# Patient Record
Sex: FEMALE | Race: WHITE | URBAN - METROPOLITAN AREA
[De-identification: names, ages, dates, MRNs, and addresses within clinical notes are randomized per-mention and may not be internally consistent; named-entity substitution may affect disease eponyms.]

---

## 2019-01-01 ENCOUNTER — TRANSFERRED RECORDS (OUTPATIENT)
Dept: MULTI SPECIALTY CLINIC | Facility: CLINIC | Age: 49
End: 2019-01-01

## 2019-01-01 LAB — PAP SMEAR - HIM PATIENT REPORTED: NORMAL

## 2019-11-02 ENCOUNTER — HEALTH MAINTENANCE LETTER (OUTPATIENT)
Age: 49
End: 2019-11-02

## 2020-11-14 ENCOUNTER — HEALTH MAINTENANCE LETTER (OUTPATIENT)
Age: 50
End: 2020-11-14

## 2021-02-06 ENCOUNTER — HEALTH MAINTENANCE LETTER (OUTPATIENT)
Age: 51
End: 2021-02-06

## 2021-09-12 ENCOUNTER — HEALTH MAINTENANCE LETTER (OUTPATIENT)
Age: 51
End: 2021-09-12

## 2022-01-02 ENCOUNTER — HEALTH MAINTENANCE LETTER (OUTPATIENT)
Age: 52
End: 2022-01-02

## 2022-02-27 ENCOUNTER — HEALTH MAINTENANCE LETTER (OUTPATIENT)
Age: 52
End: 2022-02-27

## 2022-07-14 ENCOUNTER — OFFICE VISIT (OUTPATIENT)
Dept: INTERNAL MEDICINE | Facility: CLINIC | Age: 52
End: 2022-07-14
Payer: COMMERCIAL

## 2022-07-14 VITALS
HEART RATE: 65 BPM | OXYGEN SATURATION: 98 % | HEIGHT: 63 IN | SYSTOLIC BLOOD PRESSURE: 119 MMHG | TEMPERATURE: 98.4 F | WEIGHT: 109 LBS | BODY MASS INDEX: 19.31 KG/M2 | DIASTOLIC BLOOD PRESSURE: 78 MMHG

## 2022-07-14 DIAGNOSIS — Z12.11 SCREEN FOR COLON CANCER: ICD-10-CM

## 2022-07-14 DIAGNOSIS — G89.29 CHRONIC NECK PAIN: ICD-10-CM

## 2022-07-14 DIAGNOSIS — M54.2 CHRONIC NECK PAIN: ICD-10-CM

## 2022-07-14 DIAGNOSIS — Z00.00 ROUTINE GENERAL MEDICAL EXAMINATION AT A HEALTH CARE FACILITY: Primary | ICD-10-CM

## 2022-07-14 DIAGNOSIS — G47.9 SLEEP DIFFICULTIES: ICD-10-CM

## 2022-07-14 LAB — HBA1C MFR BLD: 5.7 % (ref 0–5.6)

## 2022-07-14 PROCEDURE — 99214 OFFICE O/P EST MOD 30 MIN: CPT | Mod: 25 | Performed by: NURSE PRACTITIONER

## 2022-07-14 PROCEDURE — 36416 COLLJ CAPILLARY BLOOD SPEC: CPT | Performed by: NURSE PRACTITIONER

## 2022-07-14 PROCEDURE — 83036 HEMOGLOBIN GLYCOSYLATED A1C: CPT | Performed by: NURSE PRACTITIONER

## 2022-07-14 PROCEDURE — 99386 PREV VISIT NEW AGE 40-64: CPT | Performed by: NURSE PRACTITIONER

## 2022-07-14 RX ORDER — CYCLOBENZAPRINE HCL 5 MG
5 TABLET ORAL 3 TIMES DAILY PRN
Qty: 30 TABLET | Refills: 1 | Status: SHIPPED | OUTPATIENT
Start: 2022-07-14

## 2022-07-14 RX ORDER — ZOLPIDEM TARTRATE 10 MG/1
10 TABLET ORAL
Qty: 30 TABLET | Refills: 1 | Status: SHIPPED | OUTPATIENT
Start: 2022-07-14 | End: 2023-08-28

## 2022-07-14 RX ORDER — ZOLPIDEM TARTRATE 10 MG/1
10 TABLET ORAL
COMMUNITY
End: 2023-08-28

## 2022-07-14 RX ORDER — METHYLPREDNISOLONE 4 MG
TABLET, DOSE PACK ORAL
Qty: 21 TABLET | Refills: 0 | Status: SHIPPED | OUTPATIENT
Start: 2022-07-14

## 2022-07-14 ASSESSMENT — ENCOUNTER SYMPTOMS
HEMATOCHEZIA: 0
PARESTHESIAS: 0
HEMATURIA: 0
JOINT SWELLING: 1
SHORTNESS OF BREATH: 0
PALPITATIONS: 0
BREAST MASS: 0
WEAKNESS: 0
DIARRHEA: 0
ABDOMINAL PAIN: 0
EYE PAIN: 0
ARTHRALGIAS: 1
COUGH: 0
NAUSEA: 0
HEARTBURN: 0
NERVOUS/ANXIOUS: 0
DYSURIA: 0
HEADACHES: 1
SORE THROAT: 0
CONSTIPATION: 0
MYALGIAS: 1
FREQUENCY: 1
FEVER: 0
DIZZINESS: 0
CHILLS: 0

## 2022-07-14 NOTE — PROGRESS NOTES
"amb   SUBJECTIVE:   CC: Bob Mckeon is an 51 year old woman who presents for preventive health visit.     Used to follow with  [facility] in [place], PCP retired.  Lives in [place].      Patient has been advised of split billing requirements and indicates understanding: Yes     Healthy Habits:     Getting at least 3 servings of Calcium per day:  Yes    Bi-annual eye exam:  Yes    Dental care twice a year:  Yes    Sleep apnea or symptoms of sleep apnea:  None    Diet:  Regular (no restrictions)    Frequency of exercise:  2-3 days/week    Duration of exercise:  30-45 minutes    Taking medications regularly:  Yes    Medication side effects:  Not applicable    PHQ-2 Total Score: 0    Additional concerns today:  No    Health Maintenance Screening:    -Immunizations:   -Influenza: Up to date   -Pneumococcal: NA   -Td/Tdap: Up to date   -Shingles: Due   -HPV: NA   -MMR: NA   -COVID: Anna x1; declines Booster    -Colon Cancer Screening:  Due    -Lung Cancer Screening:  NA    -Breast Cancer Screening: Up to date- had in [place] 2022    -Cervical Cancer Screening:  3 Years ago- \"normal\"    -STD Screen:  Low risk    -Cholesterol Screening:  Up to date; completed June, 2021    SPECIMEN TYPE     CHOLESTEROL <200 mg/dL 188    TRIGLYCERIDES PROFILE <150 mg/dL 111    LDL CHOL, CALC <100 mg/dL 122 High     HDL CHOLESTEROL >40 mg/dL 44    CHOL/HDL RATIO 0.0 - 4.9 4.3      -Diabetes Screening:  Due; last completed June, 2021    HBA1C (GLYCOSOLATED HGB) <5.7 % 5.9 High     EAG (EST. AVERAGE GLUCOSE) <117 mg/dL 123 High       -Depression Screening: Due    Today's PHQ-2 Score:   PHQ-2 ( 1999 [employer]) 7/14/2022   Q1: Little interest or pleasure in doing things 0   Q2: Feeling down, depressed or hopeless 0   PHQ-2 Score 0   Q1: Little interest or pleasure in doing things Not at all   Q2: Feeling down, depressed or hopeless Not at all   PHQ-2 Score 0     Other Health Concerns:    Chronic neck pain:  States she has " had chronic neck pain for years.  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  She has pain along both sides of her neck.  She follows regularly with a chiropractor.  Recently has been having more difficulty turning her neck due to the pain and tightness.  Her chiropractor recommended that she come in to be seen for a course of prednisone therapy.  States that she will generally take prednisone when her symptoms are this bad.  She is also requesting refill of muscle relaxers.  She does endorse that on occasion she has numbness and tingling of both arms.  She has no weakness.  She also uses a TENS unit and soaks in her hot tub at nighttime.    Insomnia:  Does shift work, and has difficulty sleeping from time to time.  Has previously been prescribed as needed Ambien which she takes very infrequently.  She would like a refill for her intermittent and infrequent use.  Denies any side effects from this medication.    Abuse: Current or Past (Physical, Sexual or Emotional) - No  Do you feel safe in your environment? Yes    Have you ever done Advance Care Planning? (For example, a Health Directive, POLST, or a discussion with a medical provider or your loved ones about your wishes): No, advance care planning information given to patient to review.  Patient declined advance care planning discussion at this time.    Social History     Tobacco Use     Smoking status: Former Smoker     Smokeless tobacco: Former User     Quit date: 6/22/2013     Tobacco comment: 2 cigs per day   Substance Use Topics     Alcohol use: Yes     Comment: occasional     If you drink alcohol do you typically have >3 drinks per day or >7 drinks per week? No    Alcohol Use 7/14/2022   Prescreen: >3 drinks/day or >7 drinks/week? No   No flowsheet data found.    Reviewed orders with patient.  Reviewed health maintenance and updated orders accordingly - Yes  Labs reviewed in EPIC    Breast  Cancer Screening:  Any new diagnosis of family breast, ovarian, or bowel cancer? No    FHS-7: No flowsheet data found.    Mammogram Screening: Recommended annual mammography  Pertinent mammograms are reviewed under the imaging tab.    History of abnormal Pap smear: NO - age 30-65 PAP every 5 years with negative HPV co-testing recommended     Reviewed and updated as needed this visit by clinical staff   Tobacco  Allergies  Meds                Reviewed and updated as needed this visit by Provider                   Past Medical History:   Diagnosis Date     Arthritis     Sonce birth      Past Surgical History:   Procedure Laterality Date     BREAST SURGERY  2006     CHOLECYSTECTOMY       COSMETIC SURGERY  2006    Breast aug     OB History   No obstetric history on file.       Review of Systems   Constitutional: Negative for chills and fever.   HENT: Negative for congestion, ear pain, hearing loss and sore throat.    Eyes: Negative for pain and visual disturbance.   Respiratory: Negative for cough and shortness of breath.    Cardiovascular: Negative for chest pain, palpitations and peripheral edema.   Gastrointestinal: Negative for abdominal pain, constipation, diarrhea, heartburn, hematochezia and nausea.   Breasts:  Negative for tenderness, breast mass and discharge.   Genitourinary: Positive for frequency. Negative for dysuria, genital sores, hematuria, pelvic pain, urgency, vaginal bleeding and vaginal discharge.   Musculoskeletal: Positive for arthralgias, joint swelling and myalgias.   Skin: Negative for rash.   Neurological: Positive for headaches. Negative for dizziness, weakness and paresthesias.   Psychiatric/Behavioral: Negative for mood changes. The patient is not nervous/anxious.      CONSTITUTIONAL: NEGATIVE for fever, chills, change in weight  INTEGUMENTARU/SKIN: NEGATIVE for worrisome rashes, moles or lesions  EYES: NEGATIVE for vision changes or irritation  ENT: NEGATIVE for ear, mouth and throat  "problems  RESP: NEGATIVE for significant cough or SOB  BREAST: NEGATIVE for masses, tenderness or discharge  CV: NEGATIVE for chest pain, palpitations or peripheral edema  GI: NEGATIVE for nausea, abdominal pain, heartburn, or change in bowel habits  : NEGATIVE for unusual urinary or vaginal symptoms. Periods are regular.  MUSCULOSKELETAL: POSITIVE for neck pain, chronic  NEURO: NEGATIVE for weakness, dizziness or paresthesias  PSYCHIATRIC: NEGATIVE for changes in mood or affect     OBJECTIVE:   /78 (BP Location: Left arm, Cuff Size: Adult Regular)   Pulse 65   Temp 98.4  F (36.9  C) (Oral)   Ht 1.588 m (5' 2.5\")   Wt 49.4 kg (109 lb)   SpO2 98%   BMI 19.62 kg/m       Physical Exam  GENERAL: healthy, alert and no distress  EYES: Eyes grossly normal to inspection, PERRL and conjunctivae and sclerae normal  HENT: ear canals and TM's normal, nose and mouth without ulcers or lesions  NECK: no adenopathy, no asymmetry, masses, or scars and thyroid normal to palpation  RESP: lungs clear to auscultation - no rales, rhonchi or wheezes  CV: regular rate and rhythm, normal S1 S2, no S3 or S4, no murmur, click or rub, no peripheral edema and peripheral pulses strong  ABDOMEN: soft, nontender, no hepatosplenomegaly, no masses and bowel sounds normal  MS: Limited ROM neck- difficulty turning side to side  SKIN: no suspicious lesions or rashes  NEURO: Normal strength and tone, mentation intact and speech normal  PSYCH: mentation appears normal, affect normal/bright    Diagnostic Test Results:  Labs reviewed in Epic    ASSESSMENT/PLAN:   Bob was seen today for physical.    Diagnoses and all orders for this visit:    Routine general medical examination at a health care facility  -     REVIEW OF HEALTH MAINTENANCE PROTOCOL ORDERS  -     Hemoglobin A1c; Future  -     Hemoglobin A1c  -     Check A1c  -     FIT ordered  -     States mammogram up to date  -     States Pap up to date- am unable to find results  -     " "Declines COVID booster  -     Lipids up to date  -     Due for shingles vac- declines    Screen for colon cancer  -     Fecal colorectal cancer screen (FIT); Future  -     Fecal colorectal cancer screen (FIT)    Chronic neck pain  -     cyclobenzaprine (FLEXERIL) 5 MG tablet; Take 1 tablet (5 mg) by mouth 3 times daily as needed for muscle spasms  -     methylPREDNISolone (MEDROL DOSEPAK) 4 MG tablet therapy pack; Follow Package Directions  -     Reports chronic issue- injured at work.  Follows with chiropractor.  Requesting steroids, refill muscle relaxers    Sleep difficulties  -     zolpidem (AMBIEN) 10 MG tablet; Take 1 tablet (10 mg) by mouth nightly as needed for sleep  -     Refilled x1        Patient has been advised of split billing requirements and indicates understanding: Yes    COUNSELING:  Reviewed preventive health counseling, as reflected in patient instructions    Estimated body mass index is 19.62 kg/m  as calculated from the following:    Height as of this encounter: 1.588 m (5' 2.5\").    Weight as of this encounter: 49.4 kg (109 lb).        She reports that she has quit smoking. She quit smokeless tobacco use about 9 years ago.      Counseling Resources:  ATP IV Guidelines  Pooled Cohorts Equation Calculator  Breast Cancer Risk Calculator  BRCA-Related Cancer Risk Assessment: FHS-7 Tool  FRAX Risk Assessment  ICSI Preventive Guidelines  Dietary Guidelines for Americans, 2010  USDA's MyPlate  ASA Prophylaxis  Lung CA Screening    Araceli Collazo, APRN CNP  [facility]  "

## 2022-07-16 ENCOUNTER — TELEPHONE (OUTPATIENT)
Dept: NURSING | Facility: CLINIC | Age: 52
End: 2022-07-16

## 2022-07-16 NOTE — TELEPHONE ENCOUNTER
Medrol dose pack 7/14, threw away, looking to have Rx re-sent over, Pharmacy calling reporting the patient threw her Medrol dose pack in the garbage at work, requesting refill sent to pharmacy. Dr. Baeza contacted with ok for refill. Verbal order given for refill given to pharmacist.     Miranda Monsivais RN 07/16/22 12:02 PM   [facility] Triage Nurse Advisor

## 2022-07-22 PROCEDURE — 82274 ASSAY TEST FOR BLOOD FECAL: CPT | Performed by: NURSE PRACTITIONER

## 2022-07-24 LAB — HEMOCCULT STL QL IA: NEGATIVE

## 2022-11-19 ENCOUNTER — HEALTH MAINTENANCE LETTER (OUTPATIENT)
Age: 52
End: 2022-11-19

## 2023-04-09 ENCOUNTER — HEALTH MAINTENANCE LETTER (OUTPATIENT)
Age: 53
End: 2023-04-09

## 2023-08-28 ENCOUNTER — OFFICE VISIT (OUTPATIENT)
Dept: INTERNAL MEDICINE | Facility: CLINIC | Age: 53
End: 2023-08-28
Payer: COMMERCIAL

## 2023-08-28 ENCOUNTER — TRANSFERRED RECORDS (OUTPATIENT)
Dept: HEALTH INFORMATION MANAGEMENT | Facility: CLINIC | Age: 53
End: 2023-08-28

## 2023-08-28 VITALS
OXYGEN SATURATION: 95 % | DIASTOLIC BLOOD PRESSURE: 74 MMHG | WEIGHT: 121 LBS | SYSTOLIC BLOOD PRESSURE: 118 MMHG | TEMPERATURE: 97.8 F | HEIGHT: 63 IN | HEART RATE: 67 BPM | BODY MASS INDEX: 21.44 KG/M2

## 2023-08-28 DIAGNOSIS — F51.01 PRIMARY INSOMNIA: ICD-10-CM

## 2023-08-28 DIAGNOSIS — Z12.31 VISIT FOR SCREENING MAMMOGRAM: ICD-10-CM

## 2023-08-28 DIAGNOSIS — Z12.11 SCREEN FOR COLON CANCER: ICD-10-CM

## 2023-08-28 DIAGNOSIS — R73.09 ELEVATED GLUCOSE: ICD-10-CM

## 2023-08-28 DIAGNOSIS — Z00.00 ENCOUNTER FOR ROUTINE ADULT HEALTH EXAMINATION WITHOUT ABNORMAL FINDINGS: Primary | ICD-10-CM

## 2023-08-28 DIAGNOSIS — Z13.29 SCREENING FOR THYROID DISORDER: ICD-10-CM

## 2023-08-28 DIAGNOSIS — Z13.6 CARDIOVASCULAR SCREENING; LDL GOAL LESS THAN 130: ICD-10-CM

## 2023-08-28 DIAGNOSIS — G47.9 SLEEP DIFFICULTIES: ICD-10-CM

## 2023-08-28 PROCEDURE — 99396 PREV VISIT EST AGE 40-64: CPT | Performed by: INTERNAL MEDICINE

## 2023-08-28 RX ORDER — ZOLPIDEM TARTRATE 10 MG/1
5-10 TABLET ORAL
Qty: 30 TABLET | Refills: 1 | Status: SHIPPED | OUTPATIENT
Start: 2023-08-28

## 2023-08-28 ASSESSMENT — ENCOUNTER SYMPTOMS
WEAKNESS: 0
HEARTBURN: 0
NAUSEA: 0
JOINT SWELLING: 0
PARESTHESIAS: 0
SHORTNESS OF BREATH: 0
HEMATOCHEZIA: 0
ARTHRALGIAS: 0
HEMATURIA: 0
DYSURIA: 0
FEVER: 0
ABDOMINAL PAIN: 0
FREQUENCY: 0
EYE PAIN: 0
DIARRHEA: 0
COUGH: 0
NERVOUS/ANXIOUS: 0
MYALGIAS: 0
SORE THROAT: 0
HEADACHES: 0
DIZZINESS: 0
BREAST MASS: 0
PALPITATIONS: 0
CONSTIPATION: 0
CHILLS: 0

## 2023-08-28 NOTE — PATIENT INSTRUCTIONS
"  Return for a \"lab only appointment\" in the next 1-2 weeks to recheck fasting labs.  Please get these labs done in a fasting state with nothing to eat for at least 8 hours prior to getting labs drawn.  You can make this \"lab only appointment\" at any [facility] lab site, contact that clinic site directly to arrange this.  I will make contact either via phone or [employer] regarding the results and any recommendations once I have reviewed the lab results.          5 GOALS TO PREVENT VASCULAR DISEASE:     1.  Aggressive blood pressure control, under 130/80 ideally.  Using medications if needed.    Your blood pressure is under good control    BP Readings from Last 4 Encounters:   08/28/23 118/74   07/14/22 119/78   09/14/14 96/74   07/22/13 96/58       2.  Aggressive LDL cholesterol (\"bad cholesterol\") lowering as indicated.    Your goal is an LDL under 130 for sure, preferably under 100.  (If you have diabetes or previous vascular disease, the the LDL goals would be under 100 for sure, preferably under 70.)    New guidelines identify four high-risk groups who could benefit from statins:   *people with pre-existing heart disease, such as those who have had a heart attack;   *people ages 40 to 75 who have diabetes of any type  *patients ages 40 to 75 with at least a 7.5% risk of developing cardiovascular disease over the next decade, according to a formula described in the guidelines  *patients with the sort of super-high cholesterol that sometimes runs in families, as evidenced by an LDL of 190 milligrams per deciliter or higher      3.  Aggressive diabetic prevention, screening and/or management.      You do not have diabetes as of the most recent blood tests.     4.  No smoking    5.  Consider daily preventative aspirin over age 50 if you have enough cardiac risk factors to place you at higher risk for the presence of vascular disease.    If you have any reason not to take aspirin such easy bruising or " bleeding, stomach problems, other anticoagulant medications, or any other side effects, then you should not take Aspirin.     --Based on your current cardiac disease risk profile and/or age over 75, you do NOT need to take daily preventative aspirin.          ColoGuard Colon cancer screening:     *  The ColoGuard stool test is good noninvasive way to screen for colon cancer, in average risk individuals.      *  You should NOT use ColoGuard for colon cancer screening if you have a family history of colon cancer or if you have a history of pre-cancerous polyps, you should have a colonoscopy if you have either of these situations.      *  The ColoGuard collection kit will be mailed to your home address by the [email or web address].  Follow the instructions for collecting and returning the tablet.      *  I will inform you about the results.     *  If the ColoGuard test is Negative, then no colon cancer screening needed for 3 years (would consider either repeating the ColoGuard test or else doing a colonoscopy).     *  If the ColoGuard test is Positive, this does NOT mean that you have colon cacner, it simply means that you will need to have a colonoscopy.  Most of the time, pre-cancerous polyps can turn this test positive.          Preventive Health Recommendations  Female Ages 45 - 64    Yearly exam: See your health care provider every year in order to  Review health changes in your health history or your family medical history  Discuss preventive care.    Review and re evaluate any chronic medical conditions  Review and renew any prescription medicines, if you take prescription medicines.    Review and aggressively manage any vascular disease risk factors    Get a Pap test every 3-5 years (unless you have an abnormal result and your provider advises testing more often).  If you get Pap tests with HPV test, you only need to test every 5 years, unless you have an abnormal result.   You do not need a Pap test if your uterus  was removed (hysterectomy) and you have not had cancer.  You should be tested each year for STDs (sexually transmitted diseases) if you're at risk.   Have a mammogram every 1 to 2 years (recommended annually between age 45 and 55)  Regular colon cancer screening starting age 45 with either a colonoscopy, or stool test (either Cologuard every 3 years or yearly FIT stool test from us).  The intervals for colon cancer screening will be determined by family history and prior colon cancer screening results.  If you have a family history of colon cancer, then colon cancer screening should be considered starting age 35.     Have a cholesterol test every 5 years, or more often if advised.  Have a diabetes test (fasting glucose) at least every three years. If you are at risk for diabetes, you should have this test annually.   If you are at risk for osteoporosis (brittle bone disease), think about having a bone density scan (DEXA).    Shots:   Get a flu shot each year. Get a tetanus shot every 10 years.  Get annual influenza vaccine (flu shot) each year  Get tetanus shot every 10 years  Pneumonia vaccine should be given at age 65 unless you have medical conditions that would make us given earlier  Everyone over 50 should consider the Shingrix shingles vaccine to reduce the chance of future shingles infection.  As many as 1 and 3 adults may experience this infection in their lifetime based on the previous chickenpox infection in your youth many years ago  Covid vaccines are now recommended annually.  Consider getting the most updated COVID booster around the same time as the annual influenza vaccine.    Nutrition:   Eat at least 5 servings of fruits and vegetables each day.  Eat whole-grain bread, whole-wheat pasta and brown rice instead of white grains and rice.  Talk to your provider about Calcium and Vitamin D.    --Calcium: aim for 1200 mg per day (any brand is fine)   --Vitamin D3 at least 1000 units per day (any brand is  "fine)       --Good Grains:  Oats, brown rice, Quinoa (these do not raise the blood sugar as much)     --Bad grains:  Anything made from wheat or white rice     (because these raise the blood sugars significantly, and the possible gluten issue from wheat for some people).      --Proteins:  Aim for \"lean proteins\" including chicken, fish, seafood, pork, turkey, and eggs (in moderation); Eat red meat only occasionally      Lifestyle  Exercise at least 150 minutes a week (30 minutes a day, 5 days a week). This will help you control your weight and prevent disease.  Limit alcohol to one drink per day.  No smoking.   Wear sunscreen to prevent skin cancer.   See your dentist every six months for an exam and cleaning.  See your eye doctor every 1 to 2 years.      "

## 2023-08-28 NOTE — PROGRESS NOTES
SUBJECTIVE:   CC: Bob is an 52 year old who presents for preventive health visit.     Healthy Habits:     Getting at least 3 servings of Calcium per day:  Yes    Bi-annual eye exam:  NO    Dental care twice a year:  Yes    Sleep apnea or symptoms of sleep apnea:  None    Diet:  Regular (no restrictions)    Frequency of exercise:  1 day/week    Duration of exercise:  30-45 minutes    Taking medications regularly:  Yes    Medication side effects:  None    Additional concerns today:  No      Today's PHQ-2 Score:       2023     9:27 AM   PHQ-2 (  [employer])   Q1: Little interest or pleasure in doing things 0   Q2: Feeling down, depressed or hopeless 0   PHQ-2 Score 0   Q1: Little interest or pleasure in doing things Not at all   Q2: Feeling down, depressed or hopeless Not at all   PHQ-2 Score 0         1.  The patient has regular insomnia for which they take Ambien.  There are no reported side effects from this medication.   The patient feels that the medication helps them sleep and is beneficial enough to desire to keep taking it.    They report no excessive drowsiness, no sleep walking, no nightmares/bad dreams, no impaired memory or cognitive function, and no adverse changes in mood.       2.  History of borderline elevated hemoglobin A1c at last lab check.    Lab Results   Component Value Date    A1C 5.7 2022        Social History     Tobacco Use    Smoking status: Former     Types: Cigarettes    Smokeless tobacco: Never    Tobacco comments:     2 cigs per day   Substance Use Topics    Alcohol use: Yes     Comment: occasional             2023     9:27 AM   Alcohol Use   Prescreen: >3 drinks/day or >7 drinks/week? No     Reviewed orders with patient.  Reviewed health maintenance and updated orders accordingly - Yes      Breast Cancer Screenin/14/2022    11:29 AM   Breast CA Risk Assessment (FHS-7)   Do you have a family history of breast, colon, or ovarian cancer? No / Unknown          Mammogram Screening: Recommended annual mammography  Pertinent mammograms are reviewed under the imaging tab.    History of abnormal Pap smear: YES - updated in Problem List and Health Maintenance accordingly     Reviewed and updated as needed this visit by clinical staff   Tobacco  Allergies  Meds     Maicol Hx  Soc Hx        Reviewed and updated as needed this visit by Provider         Maicol Montero             **I reviewed the information recorded in the patient's EPIC chart (including but not limited to medical history, surgical history, family history, problem list, medication list, and allergy list) and updated the information as indicated based on the patients reported information.       Review of Systems   Constitutional:  Negative for chills and fever.   HENT:  Negative for congestion, ear pain, hearing loss and sore throat.    Eyes:  Negative for pain and visual disturbance.   Respiratory:  Negative for cough and shortness of breath.    Cardiovascular:  Negative for chest pain, palpitations and peripheral edema.   Gastrointestinal:  Negative for abdominal pain, constipation, diarrhea, heartburn, hematochezia and nausea.   Breasts:  Negative for tenderness, breast mass and discharge.   Genitourinary:  Negative for dysuria, frequency, genital sores, hematuria, pelvic pain, urgency, vaginal bleeding and vaginal discharge.   Musculoskeletal:  Negative for arthralgias, joint swelling and myalgias.   Skin:  Negative for rash.   Neurological:  Negative for dizziness, weakness, headaches and paresthesias.   Psychiatric/Behavioral:  Negative for mood changes. The patient is not nervous/anxious.      CONSTITUTIONAL: NEGATIVE for fever, chills, change in weight  INTEGUMENTARY/SKIN: NEGATIVE for worrisome rashes, moles or lesions  EYES: NEGATIVE for vision changes or irritation  ENT: NEGATIVE for ear, mouth and throat problems  RESP: NEGATIVE for significant cough or SOB  BREAST: NEGATIVE for masses, tenderness or  "discharge  CV: NEGATIVE for chest pain, palpitations or peripheral edema  GI: NEGATIVE for nausea, abdominal pain, heartburn, or change in bowel habits  : NEGATIVE for unusual urinary or vaginal symptoms. No vaginal bleeding.  MUSCULOSKELETAL: NEGATIVE for significant arthralgias or myalgia  NEURO: NEGATIVE for weakness, dizziness or paresthesias  PSYCHIATRIC: NEGATIVE for changes in mood or affect      OBJECTIVE:   /74   Pulse 67   Temp 97.8  F (36.6  C) (Temporal)   Ht 1.6 m (5' 3\")   Wt 54.9 kg (121 lb)   SpO2 95%   BMI 21.43 kg/m    Physical Exam    GENERAL alert and no distress  EYES:  Normal sclera,conjunctiva, EOMI  HENT: oral and posterior pharynx without lesions or erythema, facies symmetric  NECK: Neck supple. No LAD, without thyroidmegaly.  RESP: Clear to ausculation bilaterally without wheezes or crackles. Normal BS in all fields.  CV: RRR normal S1S2 without murmurs, rubs or gallops.  LYMPH: no cervical lymph adenopathy appreciated  MS: extremities- no gross deformities of the visible extremities noted,   EXT:  no lower extremity edema  PSYCH: Alert and oriented times 3; speech- coherent  SKIN:  No obvious significant skin lesions on visible portions of face     Diagnostic Test Results:  Labs reviewed in Epic    ASSESSMENT/PLAN:     (Z00.00) Encounter for routine adult health examination without abnormal findings  (primary encounter diagnosis)  Comment: Discussed cardiac disease risk factor modification including screening, preventing, and treating hypertension, elevated lipids, diabetes, and smoking cessation.    Discussed age appropriate cancer screening recommendations as dictated by age group and past medical history.    Recommended making better food choices as often as possible, including lower carb, lower fat, lower salt diet and moderation in any alcohol intake.    Recommended maintaining regular physical activity/exercise throughout their lifetime.  Recommended safety and injury " prevention (I.e. seatbelt use, safety equipment like helmets when biking, etc).    Counseling:    Scheduled for routine gynecological exam 8/31/23.  Distant history of abnormal Pap smears 20 years ago, required cryotherapy.  She is scheduled for mammogram next week.      ATP III Guidelines  ICSI Preventive Guidelines   Plan: Lipid panel reflex to direct LDL Fasting, Basic        metabolic panel, TSH with free T4 reflex, CBC         with platelets, Hemoglobin A1c            (F51.01) Primary insomnia  Comment: This condition is currently controlled on the current medical regimen.  Continue current therapy.   Plan: TSH with free T4 reflex            (G47.9) Sleep difficulties  Comment: This condition is currently controlled on the current medical regimen.  Continue current therapy.  Use the lowest dose necessary to help sleep when needed.  Plan: zolpidem (AMBIEN) 10 MG tablet, TSH with free         T4 reflex            (R73.09) Elevated glucose  Comment: History of borderline elevated hemoglobin A1c at 5.7 last time.  Recheck, reviewed diabetes and prediabetes.  Plan: Basic metabolic panel, Hemoglobin A1c,         CANCELED: Hemoglobin A1c            (Z12.11) Screen for colon cancer  Comment: I discussed current recommendations for routine colon cancer screening starting at age 45 (age 35 for family history of colon cancer).  Recommending colonoscopy as gold standard test, but the patient is declining to do colonoscopy at this time.   Discussed ColoGuard stool test for routine colon cancer screening, and will order it if covered by their insurance.   If result Negative, repeat ColoGuard testing every 3 years (or eventually consider colonoscopy)  If result Positive, does not necessarily mean colon cancer, but means they need colonoscopy.    Plan: REVIEW OF HEALTH MAINTENANCE PROTOCOL ORDERS            (Z13.6) CARDIOVASCULAR SCREENING; LDL GOAL LESS THAN 130  Comment: Discussed cardiac disease risk factors and cardiac  disease risk factor modification.   Plan: REVIEW OF HEALTH MAINTENANCE PROTOCOL ORDERS,         COLOGWENDY(EXACT SCIENCES), Lipid panel reflex         to direct LDL Fasting, Basic metabolic panel,         TSH with free T4 reflex, CBC with platelets,         Hemoglobin A1c, CANCELED: Lipid panel reflex to        direct LDL Non-fasting, CANCELED: CBC with         platelets, CANCELED: Basic metabolic panel            (Z13.29) Screening for thyroid disorder  Comment:   Plan: TSH with free T4 reflex, CANCELED: TSH with         free T4 reflex            (Z12.31) Visit for screening mammogram  Comment:   Plan: MA SCREENING DIGITAL BILAT - Future  (s+30),         REVIEW OF HEALTH MAINTENANCE PROTOCOL ORDERS               Patient has been advised of split billing requirements and indicates understanding: Yes      COUNSELING:  Reviewed preventive health counseling, as reflected in patient instructions       Regular exercise       Healthy diet/nutrition       Vision screening       Hearing screening       Immunizations  Stated that she was not planning on getting any Covid booster vaccines.   I recommend annual influenza vaccine.     I recommended she consider shingles vaccine now that she is over age 50.  She get this from the pharmacy where she confirm coverage and cost and get the vaccine if that is acceptable to her.         Aspirin prophylaxis not indicated       Colorectal Cancer Screening        She reports that she has quit smoking. Her smoking use included cigarettes. She has never used smokeless tobacco.          Kenneth Arellano MD  [facility]

## 2024-11-02 ENCOUNTER — HEALTH MAINTENANCE LETTER (OUTPATIENT)
Age: 54
End: 2024-11-02

## 2025-03-18 ENCOUNTER — E-VISIT (OUTPATIENT)
Dept: URGENT CARE | Facility: CLINIC | Age: 55
End: 2025-03-18
Payer: COMMERCIAL

## 2025-03-18 DIAGNOSIS — R06.2 WHEEZING: Primary | ICD-10-CM

## 2025-03-18 RX ORDER — PREDNISONE 20 MG/1
20 TABLET ORAL DAILY
Qty: 5 TABLET | Refills: 0 | Status: SHIPPED | OUTPATIENT
Start: 2025-03-18 | End: 2025-03-23

## 2025-03-18 NOTE — PATIENT INSTRUCTIONS
Thank you for choosing us for your care. I have placed an order for a prescription so that you can start treatment:  Orders Placed This Encounter   Medications     predniSONE (DELTASONE) 20 MG tablet     Sig: Take 1 tablet (20 mg) by mouth daily for 5 days.     Dispense:  5 tablet     Refill:  0       I would recommend starting prednisone if you have a history of asthma treated with prednisone for excacerbation.  If you are unsure or have not been told this, I would recommend coming in person to be evaluated    View your full visit summary for details by clicking on the link below. Your pharmacist will able to address any questions you may have about the medication.     If you're not feeling better within 5-7 days, please schedule an appointment.  You can schedule an appointment right here in [facility], or call [phone]  If the visit is for the same symptoms as your eVisit, we'll refund the cost of your eVisit if seen within seven days.